# Patient Record
Sex: MALE | Race: WHITE | NOT HISPANIC OR LATINO | Employment: FULL TIME | URBAN - METROPOLITAN AREA
[De-identification: names, ages, dates, MRNs, and addresses within clinical notes are randomized per-mention and may not be internally consistent; named-entity substitution may affect disease eponyms.]

---

## 2017-12-19 ENCOUNTER — HOSPITAL ENCOUNTER (EMERGENCY)
Facility: HOSPITAL | Age: 62
Discharge: HOME | End: 2017-12-19
Attending: EMERGENCY MEDICINE
Payer: COMMERCIAL

## 2017-12-19 VITALS
HEART RATE: 83 BPM | OXYGEN SATURATION: 97 % | SYSTOLIC BLOOD PRESSURE: 126 MMHG | WEIGHT: 151.25 LBS | RESPIRATION RATE: 18 BRPM | BODY MASS INDEX: 27.83 KG/M2 | DIASTOLIC BLOOD PRESSURE: 77 MMHG | HEIGHT: 62 IN | TEMPERATURE: 98 F

## 2017-12-19 DIAGNOSIS — T78.2XXA ANAPHYLAXIS, INITIAL ENCOUNTER: Primary | ICD-10-CM

## 2017-12-19 DIAGNOSIS — T78.40XA ALLERGIC REACTION, INITIAL ENCOUNTER: ICD-10-CM

## 2017-12-19 PROCEDURE — 99284 EMERGENCY DEPT VISIT MOD MDM: CPT | Mod: 25

## 2017-12-19 PROCEDURE — 99283 EMERGENCY DEPT VISIT LOW MDM: CPT | Mod: ,,, | Performed by: EMERGENCY MEDICINE

## 2017-12-19 PROCEDURE — S0028 INJECTION, FAMOTIDINE, 20 MG: HCPCS | Performed by: HOSPITALIST

## 2017-12-19 PROCEDURE — 96375 TX/PRO/DX INJ NEW DRUG ADDON: CPT

## 2017-12-19 PROCEDURE — 96361 HYDRATE IV INFUSION ADD-ON: CPT

## 2017-12-19 PROCEDURE — 63600175 PHARM REV CODE 636 W HCPCS

## 2017-12-19 PROCEDURE — 25000003 PHARM REV CODE 250: Performed by: HOSPITALIST

## 2017-12-19 PROCEDURE — 96374 THER/PROPH/DIAG INJ IV PUSH: CPT

## 2017-12-19 RX ORDER — EPINEPHRINE 0.3 MG/.3ML
1 INJECTION SUBCUTANEOUS
Qty: 2 DEVICE | Refills: 1 | Status: SHIPPED | OUTPATIENT
Start: 2017-12-19 | End: 2018-12-19

## 2017-12-19 RX ORDER — PREDNISONE 20 MG/1
40 TABLET ORAL DAILY
Qty: 10 TABLET | Refills: 0 | Status: SHIPPED | OUTPATIENT
Start: 2017-12-19 | End: 2017-12-24

## 2017-12-19 RX ORDER — DIPHENHYDRAMINE HCL 25 MG
25 CAPSULE ORAL
Status: COMPLETED | OUTPATIENT
Start: 2017-12-19 | End: 2017-12-19

## 2017-12-19 RX ORDER — FAMOTIDINE 10 MG/ML
20 INJECTION INTRAVENOUS ONCE
Status: COMPLETED | OUTPATIENT
Start: 2017-12-19 | End: 2017-12-19

## 2017-12-19 RX ADMIN — DIPHENHYDRAMINE HYDROCHLORIDE 25 MG: 25 CAPSULE ORAL at 04:12

## 2017-12-19 RX ADMIN — METHYLPREDNISOLONE SODIUM SUCCINATE 80 MG: 40 INJECTION, POWDER, FOR SOLUTION INTRAMUSCULAR; INTRAVENOUS at 04:12

## 2017-12-19 RX ADMIN — SODIUM CHLORIDE 1000 ML: 0.9 INJECTION, SOLUTION INTRAVENOUS at 04:12

## 2017-12-19 RX ADMIN — FAMOTIDINE 20 MG: 10 INJECTION, SOLUTION INTRAVENOUS at 04:12

## 2017-12-19 NOTE — ED PROVIDER NOTES
Encounter Date: 12/19/2017       History     Chief Complaint   Patient presents with    Allergic Reaction     generalized hives and hand swelling. denies difficulty swallowing or breathing. reports rx occured ~ 1.5 hours after eating cookies, pt has NKDA.      HPI   63 y/o man w/ PMH DVT on Xarelto p/w rash x 3 hours. Patient in usual state of health before bed, woke up around 1AM feeling diffuse itching, nausea, lightheadedness. No rash initially but around 3AM wife noted diffuse urticaria. Patient denies oral swelling, throat fullness, drooling, vomiting, diarrhea, abd pain, SOB. He reports eating a cookie from his hotel shortly before bed. No other known exposures.    Review of patient's allergies indicates:  No Known Allergies  Past Medical History:   Diagnosis Date    DVT (deep venous thrombosis)      History reviewed. No pertinent surgical history.  History reviewed. No pertinent family history.  Social History   Substance Use Topics    Smoking status: Never Smoker    Smokeless tobacco: Never Used    Alcohol use No     Review of Systems   Constitutional: Negative for chills and fever.   HENT: Negative for congestion, drooling, rhinorrhea, sore throat, trouble swallowing and voice change.    Respiratory: Negative for cough, shortness of breath, wheezing and stridor.    Cardiovascular: Negative for chest pain.   Gastrointestinal: Positive for nausea. Negative for abdominal pain, diarrhea and vomiting.   Genitourinary: Negative for dysuria.   Skin: Positive for rash.   Neurological: Positive for light-headedness. Negative for syncope.       Physical Exam     Initial Vitals [12/19/17 0348]   BP Pulse Resp Temp SpO2   126/77 83 18 98 °F (36.7 °C) 97 %      MAP       93.33         Physical Exam    Nursing note and vitals reviewed.  Constitutional: He appears well-developed and well-nourished. He is not diaphoretic. No distress.   HENT:   Head: Normocephalic and atraumatic.   Mouth/Throat: Oropharynx is clear  and moist.   No OP or tongue swelling, no oral lesions   Eyes: EOM are normal. Pupils are equal, round, and reactive to light.   Neck: Normal range of motion. Neck supple.   Cardiovascular: Normal rate and regular rhythm.   No murmur heard.  Pulmonary/Chest: No stridor. No respiratory distress. He has no wheezes. He has no rhonchi. He has no rales.   Abdominal: Soft. Bowel sounds are normal. He exhibits no distension. There is no tenderness. There is no rebound.   Neurological: He is alert and oriented to person, place, and time.   Skin: Skin is warm and dry.   Diffuse urticaria on arms, chest, abdomen, back, groin, and thighs   Psychiatric: He has a normal mood and affect.         ED Course   Procedures  Labs Reviewed - No data to display                APC / Resident Notes:   DDx incl anaphylaxis, allergic reaction, viral exanthem, contact dermatitis. Symptoms meet diagnostic criteria for anaphylaxis. Likely trigger is hotel cookie he at shortly prior to symptom onset. Will give IV solumedrol, famotidine, PO benadryl, and IV fluids. Epinephrine not indicated at this time given no hypotension or airway compromise, however will monitor closely.  Tacho Green M.D.  PGY4 [facility]  12/19/2017 5:04 AM    Update  Symptoms resolved after treatment. Observed until around 5AM, patient felt well with no recurrence. Gave Rx for short course of PDN and for Epipen. Gave return precautions for any symptoms of recurrence. Advised f/u with PCP for further evaluation.  Tacho Green M.D.  PGY4 [facility]  12/19/2017 2:13 PM                ED Course      Clinical Impression:   The primary encounter diagnosis was Anaphylaxis, initial encounter. A diagnosis of Allergic reaction, initial encounter was also pertinent to this visit.                           Tacho Green MD  Resident  12/19/17 2250

## 2017-12-19 NOTE — ED TRIAGE NOTES
Adithya Guerra, a 62 y.o. male presents to the ED with allergic reaction after eating cookies. Pt reports having starting to have itching, swelling and rash to bilateral arms and hips. Denies difficulty with breathing/swallowing.      Chief Complaint   Patient presents with    Allergic Reaction     generalized hives and hand swelling. denies difficulty swallowing or breathing. reports rx occured ~ 1.5 hours after eating cookies, pt has NKDA.      Review of patient's allergies indicates:  No Known Allergies  Past Medical History:   Diagnosis Date    DVT (deep venous thrombosis)       Adult Physical Assessment  LOC: Adithya Guerra, 62 y.o. male verified via two identifiers.  The patient is awake, alert, oriented and speaking appropriately at this time.  APPEARANCE: Patient resting comfortably and appears to be in no acute distress at this time. Patient is clean and well groomed, patient's clothing is properly fastened.  SKIN:The skin is warm and dry, color consistent with ethnicity, patient has normal skin turgor and moist mucus membranes, skin intact, no breakdown or brusing noted. Rash noted to trunk and bilateral arms, hips.  MUSCULOSKELETAL: Patient moving all extremities well, no obvious deformities noted. Swelling noted to bilateral hands.  RESPIRATORY: Airway is open and patent, respirations are spontaneous, patient has a normal effort and rate, no accessory muscle use noted.  CARDIAC: Patient has a normal rate and rhythm, no periphreal edema noted in any extremity, capillary refill < 3 seconds in all extremities  ABDOMEN: Soft and non tender to palpation, no abdominal distention noted. Bowel sounds present in all four quadrants.  NEUROLOGIC: Eyes open spontaneously, behavior appropriate to situation, follows commands, facial expression symmetrical, bilateral hand grasp equal and even, purposeful motor response noted, normal sensation in all extremities when touched with a finger.

## 2023-12-22 ENCOUNTER — COMPREHENSIVE EXAM (OUTPATIENT)
Dept: URBAN - METROPOLITAN AREA CLINIC 49 | Facility: LOCATION | Age: 68
End: 2023-12-22

## 2023-12-22 DIAGNOSIS — H35.361: ICD-10-CM

## 2023-12-22 DIAGNOSIS — H25.13: ICD-10-CM

## 2023-12-22 DIAGNOSIS — H52.03: ICD-10-CM

## 2023-12-22 PROCEDURE — 92014 COMPRE OPH EXAM EST PT 1/>: CPT

## 2023-12-22 PROCEDURE — 92015 DETERMINE REFRACTIVE STATE: CPT

## 2023-12-22 PROCEDURE — 92134 CPTRZ OPH DX IMG PST SGM RTA: CPT

## 2023-12-22 ASSESSMENT — VISUAL ACUITY
OU_CC: J1+@17"
OD_SC: 20/60-1
OS_GLARE: 20/30
OU_CC: 20/20-2
OS_SC: 20/50
OS_CC: 20/25
OD_CC: 20/30
OD_GLARE: 20/30
OD_GLARE: 20/50
OS_GLARE: 20/20
OU_SC: 20/40

## 2023-12-22 ASSESSMENT — TONOMETRY
OS_IOP_MMHG: 16
OD_IOP_MMHG: 16